# Patient Record
(demographics unavailable — no encounter records)

---

## 2024-10-30 NOTE — PHYSICAL EXAM
[Alert] : alert [Well Nourished] : well nourished [No Acute Distress] : no acute distress [Well Developed] : well developed [Normal Sclera/Conjunctiva] : normal sclera/conjunctiva [EOMI] : extra ocular movement intact [Normal Oropharynx] : the oropharynx was normal [No Respiratory Distress] : no respiratory distress [No Accessory Muscle Use] : no accessory muscle use [Clear to Auscultation] : lungs were clear to auscultation bilaterally [Normal S1, S2] : normal S1 and S2 [Normal Rate] : heart rate was normal [Regular Rhythm] : with a regular rhythm [No Edema] : no peripheral edema [Pedal Pulses Normal] : the pedal pulses are present [Normal Bowel Sounds] : normal bowel sounds [Not Tender] : non-tender [Not Distended] : not distended [Soft] : abdomen soft [No Tremors] : no tremors [Oriented x3] : oriented to person, place, and time [Acanthosis Nigricans] : no acanthosis nigricans [de-identified] : slight proptosis R eye  [de-identified] : thyroid enlarged diffusely 40 gm

## 2024-10-30 NOTE — ASSESSMENT
[FreeTextEntry1] :  HyperT secondary to Graves disease. Declines hyperT symptoms pt euthyroid clinically and biochemically. Complains of weight gain.   cont MMi 2.5 mg/5 mg  qd alternating.  she cut down on alcohol.   she is optimized medically from endocrine perspective for upcoming gyn procedure   NTMNG: declines compressive sx she had FNA previously with benign results Thyroid US 2023 ; stable small subcentimeter nodules. A new one 5 mm.  Will cont to monitor and repeat US now   Weight gain :  gained 9 lbs due to perimenopause.  advised to exercise walking 3-4 x week for 30 min.  Hot flashes and insomnia and hot flashes: check FSh /LH , estradiol   All lab results reviewed independently and discussed with patient with extensive discussion.  All questions answered Laboratory tests ordered today Continue other current medications
